# Patient Record
Sex: FEMALE | Race: BLACK OR AFRICAN AMERICAN | Employment: UNEMPLOYED | URBAN - METROPOLITAN AREA
[De-identification: names, ages, dates, MRNs, and addresses within clinical notes are randomized per-mention and may not be internally consistent; named-entity substitution may affect disease eponyms.]

---

## 2017-01-03 ENCOUNTER — OFFICE VISIT (OUTPATIENT)
Dept: OBGYN CLINIC | Age: 62
End: 2017-01-03

## 2017-01-03 VITALS
SYSTOLIC BLOOD PRESSURE: 133 MMHG | WEIGHT: 182 LBS | DIASTOLIC BLOOD PRESSURE: 76 MMHG | BODY MASS INDEX: 35.73 KG/M2 | HEART RATE: 85 BPM | HEIGHT: 60 IN

## 2017-01-03 DIAGNOSIS — N76.0 VAGINITIS AND VULVOVAGINITIS: Primary | ICD-10-CM

## 2017-01-03 RX ORDER — NYSTATIN AND TRIAMCINOLONE ACETONIDE 100000; 1 [USP'U]/G; MG/G
OINTMENT TOPICAL 2 TIMES DAILY
Qty: 30 G | Refills: 0 | Status: SHIPPED | OUTPATIENT
Start: 2017-01-03 | End: 2018-01-25 | Stop reason: SDUPTHER

## 2017-01-03 NOTE — PROGRESS NOTES
64 y/o  presents to the office for follow-up. She has abdominal bloating and was sent for ultrasound and presents for results. She also has external itching and would like a refill on Mycolog. She admits to increased PO intake, since smoking cessation 8 months ago. She has known ASCUS with negative HR-HPV and will have a repeat pap in 1 year. ROS: + external vagina itching and abdominal distension. Visit Vitals    /76    Pulse 85    Ht 5' (1.524 m)    Wt 182 lb (82.6 kg)    BMI 35.54 kg/m2     Exam : deferred    Ultrasound: Uterus: Measures 7.6 x 5.1 x 3.8 cm. Endometrial stripe thickness is 7.6 mm  which is normal. There are small benign nabothian cysts measuring overall about  5 mm. Cervix appears closed. Normal echogenicity of the uterus. No masses.     Right ovary: Measures 2.9 x 2 x 1.6 cm. Color Doppler vascular examination not  performed. The ovary could only be seen transabdominally. Morphologically, the  right ovary appears normal.     Left ovary: Measures 2.9 x 1.8 x 1.9 cm transabdominally. Bowel obscures the  examination of the left ovary during the Doppler interrogation phase, therefore  vascular evaluation could not be performed.     IMPRESSION  IMPRESSION:  1. Benign nabothian cyst.  2. Ovaries and uterus morphologically normal.    A/P:  Abdominal bloating. Normal ultrasound. RTO in 1 year for repeat pap smear. Encouraged weight loss.

## 2017-01-03 NOTE — PATIENT INSTRUCTIONS
Human Papillomavirus (HPV): Care Instructions  Your Care Instructions  The human papillomavirus (HPV) is a very common virus. There are many types of HPV. Some types cause the common skin wart. Other types cause genital warts, which can be spread by sexual contact. Some types can increase the risk for cervical and anal cancer. Having one type of HPV does not lead to having another type. Many women who have HPV may not know that they are infected until it is found with a Pap test. Your doctor uses this test to look for abnormal cells on your cervix. If you have had an abnormal Pap test, your doctor may recommend that you have an HPV test.  Like a Pap test, an HPV test is done on a sample of cells collected from the cervix. If the test finds that you have the types of HPV that might lead to cancer, your doctor may suggest more tests. This does not mean that you will develop cancer; it means that you may have an increased risk. Abnormal cell changes caused by HPV often go away on their own. If the changes do not go away, they can be treated. But because HPV can stay inside the body, the abnormal cervical cells sometimes come back. This is why it is important to follow up with your doctor and have regular Pap tests. Follow-up care is a key part of your treatment and safety. Be sure to make and go to all appointments, and call your doctor if you are having problems. Its also a good idea to know your test results and keep a list of the medicines you take. How can you care for yourself at home? · If you are going to have a Pap or HPV test, do not douche or use tampons or vaginal creams in the 24 hours before the test.  · Do not smoke. Smoking increases the risk for cervical problems and abnormal Pap tests. If you need help quitting, talk to your doctor about stop-smoking programs and medicines. These can increase your chances of quitting for good. · Use latex condoms every time you have sex.  Use them from the beginning to the end of sexual contact. · Be sure to tell your sexual partner or partners that you have HPV. Even if you do not have symptoms, you can still pass HPV to others. · Having one sex partner (who does not have STIs and does not have sex with anyone else) is a good way to avoid STIs. When should you call for help? Watch closely for changes in your health, and be sure to contact your doctor if:  · You have vaginal pain during or after sex. · You have vaginal bleeding when you are not in your menstrual period. Where can you learn more? Go to http://brianne-jacob.info/. Enter F690 in the search box to learn more about \"Human Papillomavirus (HPV): Care Instructions. \"  Current as of: May 27, 2016  Content Version: 11.1  © 9654-5884 [employer]. Care instructions adapted under license by [employer] (which disclaims liability or warranty for this information). If you have questions about a medical condition or this instruction, always ask your healthcare professional. [address] any warranty or liability for your use of this information.

## 2017-11-06 ENCOUNTER — HOSPITAL ENCOUNTER (OUTPATIENT)
Dept: MAMMOGRAPHY | Age: 62
Discharge: HOME | End: 2017-11-06
Payer: COMMERCIAL

## 2017-11-06 DIAGNOSIS — Z12.39 SCREENING BREAST EXAMINATION: ICD-10-CM

## 2017-11-06 PROCEDURE — 77067 SCR MAMMO BI INCL CAD: CPT

## 2017-11-17 ENCOUNTER — OFFICE VISIT (OUTPATIENT)
Dept: OBGYN CLINIC | Age: 62
End: 2017-11-17

## 2017-11-17 VITALS — HEIGHT: 60 IN

## 2017-12-12 ENCOUNTER — OFFICE VISIT (OUTPATIENT)
Dept: OBGYN CLINIC | Age: 62
End: 2017-12-12

## 2017-12-12 VITALS
HEART RATE: 93 BPM | WEIGHT: 173 LBS | SYSTOLIC BLOOD PRESSURE: 144 MMHG | HEIGHT: 60 IN | DIASTOLIC BLOOD PRESSURE: 95 MMHG | BODY MASS INDEX: 33.96 KG/M2

## 2017-12-12 DIAGNOSIS — Z01.419 WELL WOMAN EXAM WITH ROUTINE GYNECOLOGICAL EXAM: Primary | ICD-10-CM

## 2017-12-12 DIAGNOSIS — R87.610 ASCUS OF CERVIX WITH NEGATIVE HIGH RISK HPV: ICD-10-CM

## 2017-12-12 NOTE — PROGRESS NOTES
62 y/o with h/o ASCUS with normal HPV presents to the office for 1 year pap follow-up. She has no concerns today and notes  She is tired of having the pap test. She denies any other concerns, change in medical history, medication or surgeries. She is sexually active and notes only white discharge, which is non-irritating. Active Ambulatory Problems     Diagnosis Date Noted    No Active Ambulatory Problems     Resolved Ambulatory Problems     Diagnosis Date Noted    No Resolved Ambulatory Problems     Past Medical History:   Diagnosis Date    History of colon polyps     Hypercholesterolemia     Hypertension     Menopause      Visit Vitals    BP (!) 144/95    Pulse 93    Ht 5' (1.524 m)    Wt 173 lb (78.5 kg)    BMI 33.79 kg/m2     Gen: A&Ox3, NAD  Abdomen: soft, NT  SVE: NEFG,, white discharge appreciated  Pap smear performed  BME: deferred    A/P:  Pap done for h/o abnormal.  Will base follow-up recommendations on the pathology report. RTO prn. The plan of care has been discussed with the patient. She has been given the opportunity to ask questions, which seem to have been answered satisfactorily.

## 2017-12-12 NOTE — PATIENT INSTRUCTIONS
Pap Test: Care Instructions  Your Care Instructions    The Pap test (also called a Pap smear) is a screening test for cancer of the cervix, which is the lower part of the uterus that opens into the vagina. The test can help your doctor find early changes in the cells that could lead to cancer. The sample of cells taken during your test has been sent to a lab so that an expert can look at the cells. It usually takes a week or two to get the results back. Follow-up care is a key part of your treatment and safety. Be sure to make and go to all appointments, and call your doctor if you are having problems. It's also a good idea to know your test results and keep a list of the medicines you take. What do the results mean? · A normal result means that the test did not find any abnormal cells in the sample. · An abnormal result can mean many things. Most of these are not cancer. The results of your test may be abnormal because:  ¨ You have an infection of the vagina or cervix, such as a yeast infection. ¨ You have an IUD (intrauterine device for birth control). ¨ You have low estrogen levels after menopause that are causing the cells to change. ¨ You have cell changes that may be a sign of precancer or cancer. The results are ranked based on how serious the changes might be. There are many other reasons why you might not get a normal result. If the results were abnormal, you may need to get another test within a few weeks or months. If the results show changes that could be a sign of cancer, you may need a test called a colposcopy, which provides a more complete view of the cervix. Sometimes the lab cannot use the sample because it does not contain enough cells or was not preserved well. If so, you may need to have the test again. This is not common, but it does happen from time to time. When should you call for help?   Watch closely for changes in your health, and be sure to contact your doctor if:  ? · You have vaginal bleeding or pain for more than 2 days after the test. It is normal to have a small amount of bleeding for a day or two after the test.   Where can you learn more? Go to http://brianne-jacob.info/. Enter G096 in the search box to learn more about \"Pap Test: Care Instructions. \"  Current as of: May 12, 2017  Content Version: 11.4  © 0469-9024 [employer]. Care instructions adapted under license by [employer] (which disclaims liability or warranty for this information). If you have questions about a medical condition or this instruction, always ask your healthcare professional. [address] any warranty or liability for your use of this information.

## 2017-12-17 LAB
CYTOLOGIST CVX/VAG CYTO: ABNORMAL
CYTOLOGY CVX/VAG DOC THIN PREP: ABNORMAL
DX ICD CODE: ABNORMAL
DX ICD CODE: ABNORMAL
HPV I/H RISK 4 DNA CVX QL PROBE+SIG AMP: NEGATIVE
Lab: ABNORMAL
OTHER STN SPEC: ABNORMAL
PATH REPORT.FINAL DX SPEC: ABNORMAL
PATHOLOGIST CVX/VAG CYTO: ABNORMAL
STAT OF ADQ CVX/VAG CYTO-IMP: ABNORMAL

## 2018-01-25 DIAGNOSIS — N76.0 VAGINITIS AND VULVOVAGINITIS: ICD-10-CM

## 2018-01-25 RX ORDER — NYSTATIN AND TRIAMCINOLONE ACETONIDE 100000; 1 [USP'U]/G; MG/G
OINTMENT TOPICAL 2 TIMES DAILY
Qty: 30 G | Refills: 0 | Status: SHIPPED | OUTPATIENT
Start: 2018-01-25

## 2018-02-14 PROBLEM — Z98.890 S/P ABDOMINOPLASTY: Status: ACTIVE | Noted: 2018-02-14

## 2018-03-02 ENCOUNTER — HOSPITAL ENCOUNTER (OUTPATIENT)
Dept: NON INVASIVE DIAGNOSTICS | Age: 63
Discharge: HOME | End: 2018-03-02
Attending: INTERNAL MEDICINE
Payer: COMMERCIAL

## 2018-03-02 DIAGNOSIS — I50.9 CHF (CONGESTIVE HEART FAILURE) (HCC): ICD-10-CM

## 2018-03-02 PROCEDURE — 93306 TTE W/DOPPLER COMPLETE: CPT

## 2018-03-23 ENCOUNTER — OFFICE VISIT (OUTPATIENT)
Dept: SURGERY | Age: 63
End: 2018-03-23

## 2018-03-23 ENCOUNTER — TELEPHONE (OUTPATIENT)
Dept: SURGERY | Age: 63
End: 2018-03-23

## 2018-03-23 VITALS
DIASTOLIC BLOOD PRESSURE: 81 MMHG | TEMPERATURE: 97.5 F | BODY MASS INDEX: 31.06 KG/M2 | HEART RATE: 118 BPM | WEIGHT: 158.2 LBS | HEIGHT: 60 IN | SYSTOLIC BLOOD PRESSURE: 125 MMHG | RESPIRATION RATE: 18 BRPM | OXYGEN SATURATION: 96 %

## 2018-03-23 DIAGNOSIS — E11.9 CONTROLLED TYPE 2 DIABETES MELLITUS WITHOUT COMPLICATION, UNSPECIFIED LONG TERM INSULIN USE STATUS: ICD-10-CM

## 2018-03-23 DIAGNOSIS — N61.1 BREAST ABSCESS: Primary | ICD-10-CM

## 2018-03-23 DIAGNOSIS — I10 HYPERTENSION, ESSENTIAL: ICD-10-CM

## 2018-03-23 RX ORDER — CYCLOBENZAPRINE HCL 5 MG
TABLET ORAL
Refills: 0 | COMMUNITY
Start: 2017-12-18

## 2018-03-23 RX ORDER — SIMVASTATIN 20 MG/1
TABLET, FILM COATED ORAL
COMMUNITY
Start: 2017-10-18

## 2018-03-23 RX ORDER — GLIPIZIDE 10 MG/1
5 TABLET ORAL
COMMUNITY
Start: 2017-10-12

## 2018-03-23 RX ORDER — AMOXICILLIN AND CLAVULANATE POTASSIUM 500; 125 MG/1; MG/1
TABLET, FILM COATED ORAL
COMMUNITY

## 2018-03-23 NOTE — PROGRESS NOTES
History of present illness    Halina Zarco comes to the office today on referral from Dr. Eveline Delatorre office because of pain swelling and drainage from the left breast nipple area. The patient states that she has had inverted nipples for some time. She often cleans them after showering with a Q-tip. She did that on the left breast one week ago and felt pain when doing it. Subsequent to that she noticed a little bit of crusting and then she began to notice some swelling and tenderness. She went to see Dr. Eveline Delatorre on 3/15/18. He noticed that with some compression that a small amount of pus came from the nipple area. A culture was done. Skin fluoroscopy was felt to be what grew on the culture with 1 of these being a staph. She has been on Augmentin since the 15th. She states that the tenderness has decreased and the swelling has slightly decreased. She has a small red area at the edge of the nipple which is very sensitive to touch. When she compressed the nipple area this morning she noticed some pus again. She has had no fever or chills. The patient's last mammogram was about November of last year. She has had no significant previous breast problems. She does however state that about 37 years ago during the time when she was pregnant she did the same thing on the right nipple and had some soreness and swelling which was perhaps not as severe as on this occasion.     Allergies   Allergen Reactions    Lisinopril Cough     Past Medical History:   Diagnosis Date    History of colon polyps     Hypercholesterolemia     Hypertension     Menopause      Past Surgical History:   Procedure Laterality Date    CKC, AKA COLD KNIFE CONE       Social History     Social History    Marital status:      Spouse name: N/A    Number of children: N/A    Years of education: N/A     Social History Main Topics    Smoking status: Former Smoker     Types: Cigarettes     Quit date: 4/3/2016    Smokeless tobacco: Never Used    Alcohol use 0.0 oz/week     0 Standard drinks or equivalent per week      Comment: rarely    Drug use: No      Comment: denies    Sexual activity: No     Other Topics Concern    None     Social History Narrative     REVIEW OF SYSTEMS     Constitutional: No fever, weight loss, fatigue or recent chills. Skin:  No recent rashes, dermatitis or abnormal moles. HEENT:  No changes in vision, vertigo, epistaxis, dysphasia, or hoarseness. Cardiac:  No chest pain, palpitations, or edema. History of hypertension and hypercholesterolemia     Respiratory: No chronic cough, shortness of breath, wheezing, hemoptysis, or history of sleep apnea. Breasts/GYN:. Mammograms are up to date. No GYN-type problems. See the history of present illness     Gastrointestinal:  No significant food intolerances, no recent vomiting, no chronic abdominal pain, no change in bowel habits, no melena. No history of GERD. Abdominal plasty done about 1 month ago. Genitourinary:  No history of hematuria, dysuria, frequency, or stress urinary incontinence. No nocturia. Musculoskeletal: No weakness, joint pains, or arthritis. Endocrine:  No history of thyroid disease. History of diabetes diabetes. Lymph/hemo:  No history of blood transfusions or easy bruising. No anemia. Neuro: No dizziness or headaches or fainting. PHYSICAL EXAM    Visit Vitals    /81 (BP 1 Location: Left arm, BP Patient Position: Sitting)    Pulse (!) 118    Temp 97.5 °F (36.4 °C) (Oral)    Resp 18    Ht 5' (1.524 m)    Wt 71.8 kg (158 lb 3.2 oz)    SpO2 96%    BMI 30.9 kg/m2          Constitutional:  Well-developed, well-nourished, no acute distress. Patient stated she was very anxious about her visit today. Head:  Head, eyes, ears, nose, throat within normal limits. Skin:  No suspicious moles or rashes. Neck:  No masses or adenopathy.  The airway appears normal. Thyroid is not enlarged and there are no palpable thyroid nodules. Lungs:  Lungs are clear to auscultation and percussion. No respiratory distress. No chest wall tenderness. Heart:  Heart is regular with no extra heart sounds or murmur heard. Breast Exam: The breasts are free of any discrete tenderness or masses except around the left breast nipple. The skin and right nipple area appear normal. Both axillae are negative. The left breast nipple is slightly inverted. In the lateral 3 o'clock position she has about a slightly less than 1 cm raised area. There is some swelling underneath this but with significant compression I was unable to bring out any drainage. It did not definitely seem fluctuant but firm and slightly tender. Abdomen: The abdomen is soft and nontender without organomegaly or masses. Bowel sounds are active and of normal pitch. There is no abdominal distention. No hernias are evident. Patient has a nicely healing transverse abdominoplasty incision from relatively recent surgery. There is still some swelling and mild tenderness. Extremities:  No tenderness of the extremities and no significant swelling. Psych:  Alert and oriented. Assessment: #1 swollen slightly tender area edge of areola left breast abscess by history which seems to be responding to antibiotics No. 2 diabetes #3 hypertension #4 hypercholesterolemia. Recommendations: I recommended to the patient that we look at this area with the ultrasound. She is agreeable with this.     [facility]  OFFICE PROCEDURE PROGRESS NOTE        Chart reviewed for the following:   Radha Bergman MD, have reviewed the History, Physical and updated the Allergic reactions for [address] performed immediately prior to start of procedure:   I, Yanet Muhammad MD, have performed the following reviews on Rukhsana Pham prior to the start of the procedure:            * Patient was identified by name and date of birth   * Agreement on procedure being performed was verified  * Risks and Benefits explained to the patient  * Procedure site verified and marked as necessary  * Patient was positioned for comfort  * Consent was signed and verified     Time: Noon time      Date of procedure: 3/23/2018    Procedure performed by:  Petros Grayson MD    Provider assisted by: Mario Stewart LPN    Patient assisted by: Nurse    How tolerated by patient: tolerated the procedure well with no complications    Post Procedural Pain Scale: 0 - No Hurt    Comments: I used our ultrasound to scan over the nipple areolar complex of the left breast we visualized a hypoechoic area right under the area of swelling that measured about 1.6 x 0.8 cm. Indeed this may represent a small area of fluid accumulation. However, as mentioned I was unable to express any fluid like the patient did earlier today. Since the patient has been experiencing improvement and decrease in swelling I recommended that we consider continuing her antibiotics until fully completed. I will then see her back in the office in about 5 days. If that area has gotten bigger or has not resolved more than we would consider an incision and drainage. The patient is agreeable. The above was dictated with Earnestine. There may be unrecognized errors.     Enrique Biswas MD

## 2018-03-23 NOTE — PATIENT INSTRUCTIONS
If you have any questions or concerns about today's appointment, the verbal and/or written instructions you were given for follow up care, please call our office at [phone].     [facility] - [address]    [phone] office  [phone]

## 2018-03-23 NOTE — COMMUNICATION BODY
Dear Chris Rios came to the office today for evaluation of the recent abscess involving the nipple area of the left breast.  Thank you for allowing me to see her. She has experienced some improvement since she started the antibiotics. She had not noticed any drainage until today when she compressed the nipple areolar area and noticed a small amount of pus. Overall she states that the swelling has decreased as has the tenderness. She still has a small less than 1 cm raised area that is sensitive to touch. There is some underlying swelling. This may represent residual abscess but there was no definite fluctuance. Therefore I suggested to the patient that we allow her to complete her course of antibiotics and I will see her back in the office in a few days. If the area has not more fully resolved then we will do an incision and drainage if necessary. I appreciate the opportunity to work with you in her care.     With kindest regards,    Juliet Zimmer MD

## 2018-03-23 NOTE — PROGRESS NOTES
Matthew Romo is a 63 y.o. female who presents for a surgical evaluation of a left breast abscess. She is currently taking Augmentin as prescribed per her PCP on 03/15/18.

## 2018-03-27 ENCOUNTER — OFFICE VISIT (OUTPATIENT)
Dept: SURGERY | Age: 63
End: 2018-03-27

## 2018-03-27 VITALS
HEIGHT: 60 IN | RESPIRATION RATE: 16 BRPM | HEART RATE: 111 BPM | SYSTOLIC BLOOD PRESSURE: 112 MMHG | WEIGHT: 186.4 LBS | DIASTOLIC BLOOD PRESSURE: 82 MMHG | OXYGEN SATURATION: 98 % | TEMPERATURE: 97.1 F | BODY MASS INDEX: 36.6 KG/M2

## 2018-03-27 DIAGNOSIS — N61.1 BREAST ABSCESS: Primary | ICD-10-CM

## 2018-03-27 PROBLEM — E66.01 SEVERE OBESITY (BMI 35.0-39.9) WITH COMORBIDITY (HCC): Status: ACTIVE | Noted: 2018-03-27

## 2018-03-27 NOTE — PROGRESS NOTES
Chief Complaint   Patient presents with    Follow-up     follow up from left nipple abscess. Patient states area is smaller, but feels harder than before. No pain noted. 1. Have you been to the ER, urgent care clinic since your last visit? Hospitalized since your last visit? No    2. Have you seen or consulted any other health care providers outside of the [address] [place] [address] since your last visit? Include any pap smears or colon screening.  No

## 2018-03-27 NOTE — MR AVS SNAPSHOT
[facility] 
 
 
 [address] [facility] [address] [address] 
[phone] Patient: Naren Lane MRN: [id number] :1955 Visit Information Date & Time Provider Department Dept. Phone Encounter #  
 3/27/2018 11:00 AM Deondre Araujo MD [place] [id number] [id number] Your Appointments 3/27/2018 11:00 AM  
Follow Up with Deondre Araujo MD  
[address] [place] [address]) Appt Note: One week follow up  
 [address] [facility] [address] [place]  
  
   
 [address] [facility] [address] [place] Upcoming Health Maintenance Date Due Hepatitis C Screening 1955 LIPID PANEL Q1 1955 FOOT EXAM Q1 10/29/1967 MICROALBUMIN Q1 10/29/1967 EYE EXAM RETINAL OR DILATED Q1 10/29/1967 Pneumococcal 19-64 Medium Risk (1 of 1 - PPSV23) 10/29/1976 DTaP/Tdap/Td series (1 - Tdap) 10/29/1978 FOBT Q 1 YEAR AGE 50-75 10/29/2007 Influenza Age 5 to Adult 2017 ZOSTER VACCINE AGE 60> 2017 HEMOGLOBIN A1C Q6M 2018 BREAST CANCER SCRN MAMMOGRAM 2019 PAP AKA CERVICAL CYTOLOGY 2020 Allergies as of 3/27/2018  Review Complete On: 3/23/2018 By: Deondre Araujo MD  
  
 Severity Noted Reaction Type Reactions Lisinopril  2016    Cough Current Immunizations  Never Reviewed No immunizations on file. Not reviewed this visit Vitals OB Status Smoking Status Postmenopausal Former Smoker Your Updated Medication List  
  
   
This list is accurate as of 3/27/18 10:55 AM.  Always use your most recent med list.  
  
  
  
  
 amoxicillin-clavulanate 500-125 mg per tablet Commonly known as:  AUGMENTIN Take  by mouth. cyclobenzaprine 5 mg tablet Commonly known as:  FLEXERIL  
 TAKE 1 TABLET BY MOUTH EVERY 6 TO 8 HOURS AS NEEDED FOR MUSCLE SPASM  
  
 glipiZIDE 10 mg tablet Commonly known as:  GLUCOTROL  
  
 HYZAAR 100-12.5 mg per tablet Generic drug:  losartan-hydroCHLOROthiazide Take 1 Tab by mouth daily. metFORMIN 500 mg tablet Commonly known as:  GLUCOPHAGE Take  by mouth two (2) times daily (with meals). Patient states daily  
  
 nystatin-triamcinolone 100,000-0.1 unit/gram-% ointment Commonly known as:  Xander Gunning Apply  to affected area two (2) times a day. simvastatin 20 mg tablet Commonly known as:  ZOCOR Introducing [facility]! Kareem Guzman introduces [employer] patient portal. Now you can access parts of your medical record, email your doctor's office, and request medication refills online. 1. In your internet browser, go to [email or web address] 2. Click on the First Time User? Click Here link in the Sign In box. You will see the New Member Sign Up page. 3. Enter your [employer] Access Code exactly as it appears below. You will not need to use this code after youve completed the sign-up process. If you do not sign up before the expiration date, you must request a new code. · [employer] Access Code: [email or web address] Expires: 5/16/2018  2:55 PM 
 
4. Enter the last four digits of your Social Security Number (xxxx) and Date of Birth (mm/dd/yyyy) as indicated and click Submit. You will be taken to the next sign-up page. 5. Create a Sigmascreeningt ID. This will be your [employer] login ID and cannot be changed, so think of one that is secure and easy to remember. 6. Create a [employer] password. You can change your password at any time. 7. Enter your Password Reset Question and Answer. This can be used at a later time if you forget your password. 8. Enter your e-mail address. You will receive e-mail notification when new information is available in [address] 9. Click Sign Up. You can now view and download portions of your medical record. 10. Click the Download Summary menu link to download a portable copy of your medical information. If you have questions, please visit the Frequently Asked Questions section of the [employer] website. Remember, [employer] is NOT to be used for urgent needs. For medical emergencies, dial 911. Now available from your iPhone and Android! Please provide this summary of care documentation to your next provider. Your primary care clinician is listed as TIFFANI MACIEL. If you have any questions after today's visit, please call [phone].

## 2018-03-27 NOTE — PATIENT INSTRUCTIONS
If you have any questions or concerns about today's appointment, the verbal and/or written instructions you were given for follow up care, please call our office at [phone].     [facility] - [address]    [phone] office  [phone]

## 2018-03-27 NOTE — PROGRESS NOTES
Naren Lane comes back to the office today for a follow-up evaluation of an abscess in the left breast in the nipple areolar area. When seen about 5 days ago the patient had been on antibiotics and was noticing improvement in the abscess. There have been some drainage but the swelling had decreased and the tenderness was somewhat better. There is still appear to be a small area of fluid collection and the patient had had some drainage in the morning when I last saw her 5 days ago. We decided to continue the antibiotics and see me again this week. The patient has had a little bit of drainage each day. The tenderness has continued to improve. She has not had any drainage in the last 24 hours. She finished her antibiotics yesterday. Allergies   Allergen Reactions    Lisinopril Cough     Past Medical History:   Diagnosis Date    History of colon polyps     Hypercholesterolemia     Hypertension     Menopause      Past Surgical History:   Procedure Laterality Date    CKC, AKA COLD KNIFE CONE       Social History     Social History    Marital status:      Spouse name: N/A    Number of children: N/A    Years of education: N/A     Social History Main Topics    Smoking status: Former Smoker     Types: Cigarettes     Quit date: 4/3/2016    Smokeless tobacco: Never Used    Alcohol use 0.0 oz/week     0 Standard drinks or equivalent per week      Comment: rarely    Drug use: No      Comment: denies    Sexual activity: No     Other Topics Concern    None     Social History Narrative     PHYSICAL EXAM    Visit Vitals    /82 (BP 1 Location: Left arm, BP Patient Position: At rest)    Pulse (!) 111    Temp 97.1 °F (36.2 °C) (Oral)    Resp 16    Ht 5' (1.524 m)    Wt 84.6 kg (186 lb 6.4 oz)    SpO2 98%    BMI 36.4 kg/m2          Constitutional:  Well-developed, well-nourished, no acute distress.      Breast Exam: The nipple areolar complex on the left breast looks better than it did 5 days ago. There is less redness and swelling. There is less tenderness. She still has about a 1 cm area of nodularity at the 3 o'clock position near the edge of the nipple. Psych:  Alert and oriented. Assessment: Resolving abscess left breast.  Presently some residual induration but no fluctuance. Recommendations: I have encouraged the patient to use some warm compresses 2 or 3 times a day. She is to call if this area does not continue to resolve or if anything worsens. I encouraged her to follow-up with her mammogram towards the end of this year and her usual annual breast exams. The above was dictated with Earnestine. There may be unrecognized errors.     Seda Kelly MD

## 2018-08-03 ENCOUNTER — HOSPITAL ENCOUNTER (OUTPATIENT)
Dept: MRI IMAGING | Age: 63
Discharge: HOME | End: 2018-08-03
Attending: INTERNAL MEDICINE
Payer: COMMERCIAL

## 2018-08-03 DIAGNOSIS — M54.12 CERVICAL RADICULOPATHY: ICD-10-CM

## 2018-08-03 PROCEDURE — 72141 MRI NECK SPINE W/O DYE: CPT

## 2018-10-17 ENCOUNTER — HOSPITAL ENCOUNTER (OUTPATIENT)
Dept: LAB | Age: 63
Discharge: HOME | End: 2018-10-17

## 2018-10-17 LAB — SENTARA SPECIMEN COL,SENBCF: NORMAL

## 2018-10-17 PROCEDURE — 99001 SPECIMEN HANDLING PT-LAB: CPT | Performed by: INTERNAL MEDICINE

## 2018-12-27 ENCOUNTER — HOSPITAL ENCOUNTER (OUTPATIENT)
Dept: MAMMOGRAPHY | Age: 63
Discharge: HOME | End: 2018-12-27
Payer: COMMERCIAL

## 2018-12-27 DIAGNOSIS — Z12.31 VISIT FOR SCREENING MAMMOGRAM: ICD-10-CM

## 2018-12-27 PROCEDURE — 77063 BREAST TOMOSYNTHESIS BI: CPT

## 2019-11-18 ENCOUNTER — HOSPITAL ENCOUNTER (OUTPATIENT)
Dept: VASCULAR SURGERY | Age: 64
Discharge: HOME | End: 2019-11-18
Attending: INTERNAL MEDICINE
Payer: MEDICAID

## 2019-11-18 ENCOUNTER — HOSPITAL ENCOUNTER (OUTPATIENT)
Dept: LAB | Age: 64
Discharge: HOME | End: 2019-11-18
Attending: INTERNAL MEDICINE
Payer: MEDICAID

## 2019-11-18 DIAGNOSIS — R60.9 EDEMA: ICD-10-CM

## 2019-11-18 LAB
D DIMER PPP FEU-MCNC: 1.51 UG/ML(FEU)
ERYTHROCYTE [DISTWIDTH] IN BLOOD BY AUTOMATED COUNT: 14.6 % (ref 11.6–14.5)
HCT VFR BLD AUTO: 27.8 % (ref 35–45)
HGB BLD-MCNC: 8.8 G/DL (ref 12–16)
MCH RBC QN AUTO: 26.7 PG (ref 24–34)
MCHC RBC AUTO-ENTMCNC: 31.7 G/DL (ref 31–37)
MCV RBC AUTO: 84.2 FL (ref 74–97)
PLATELET # BLD AUTO: 371 K/UL (ref 135–420)
PMV BLD AUTO: 8.6 FL (ref 9.2–11.8)
RBC # BLD AUTO: 3.3 M/UL (ref 4.2–5.3)
WBC # BLD AUTO: 8.7 K/UL (ref 4.6–13.2)

## 2019-11-18 PROCEDURE — 85379 FIBRIN DEGRADATION QUANT: CPT

## 2019-11-18 PROCEDURE — 36415 COLL VENOUS BLD VENIPUNCTURE: CPT

## 2019-11-18 PROCEDURE — 85027 COMPLETE CBC AUTOMATED: CPT

## 2019-11-18 PROCEDURE — 93970 EXTREMITY STUDY: CPT

## 2019-11-19 ENCOUNTER — HOSPITAL ENCOUNTER (OUTPATIENT)
Dept: CT IMAGING | Age: 64
Discharge: HOME | End: 2019-11-19
Attending: INTERNAL MEDICINE
Payer: MEDICAID

## 2019-11-19 DIAGNOSIS — R06.00 DYSPNEA: ICD-10-CM

## 2019-11-19 LAB — CREAT UR-MCNC: 1 MG/DL (ref 0.6–1.3)

## 2019-11-19 PROCEDURE — 74011000258 HC RX REV CODE- 258: Performed by: INTERNAL MEDICINE

## 2019-11-19 PROCEDURE — 82565 ASSAY OF CREATININE: CPT

## 2019-11-19 PROCEDURE — 74011636320 HC RX REV CODE- 636/320: Performed by: INTERNAL MEDICINE

## 2019-11-19 PROCEDURE — 71275 CT ANGIOGRAPHY CHEST: CPT

## 2019-11-19 RX ORDER — SODIUM CHLORIDE 9 MG/ML
100 INJECTION, SOLUTION INTRAVENOUS ONCE
Status: COMPLETED | OUTPATIENT
Start: 2019-11-19 | End: 2019-11-19

## 2019-11-19 RX ADMIN — IOPAMIDOL 80 ML: 755 INJECTION, SOLUTION INTRAVENOUS at 15:09

## 2019-11-19 RX ADMIN — SODIUM CHLORIDE 31 ML: 900 INJECTION, SOLUTION INTRAVENOUS at 15:10

## 2019-12-10 ENCOUNTER — HOSPITAL ENCOUNTER (OUTPATIENT)
Dept: MAMMOGRAPHY | Age: 64
Discharge: HOME | End: 2019-12-10
Payer: MEDICAID

## 2019-12-10 DIAGNOSIS — Z12.31 VISIT FOR SCREENING MAMMOGRAM: ICD-10-CM

## 2019-12-10 PROCEDURE — 77063 BREAST TOMOSYNTHESIS BI: CPT
